# Patient Record
Sex: FEMALE | Race: WHITE | NOT HISPANIC OR LATINO | Employment: FULL TIME | URBAN - METROPOLITAN AREA
[De-identification: names, ages, dates, MRNs, and addresses within clinical notes are randomized per-mention and may not be internally consistent; named-entity substitution may affect disease eponyms.]

---

## 2018-03-20 ENCOUNTER — TRANSCRIBE ORDERS (OUTPATIENT)
Dept: LAB | Facility: HOSPITAL | Age: 33
End: 2018-03-20

## 2018-03-20 ENCOUNTER — LAB (OUTPATIENT)
Dept: LAB | Facility: HOSPITAL | Age: 33
End: 2018-03-20

## 2018-03-20 DIAGNOSIS — Z34.81 PRENATAL CARE, SUBSEQUENT PREGNANCY, FIRST TRIMESTER: ICD-10-CM

## 2018-03-20 DIAGNOSIS — Z3A.01 LESS THAN 8 WEEKS GESTATION OF PREGNANCY: Primary | ICD-10-CM

## 2018-03-20 DIAGNOSIS — Z3A.01 LESS THAN 8 WEEKS GESTATION OF PREGNANCY: ICD-10-CM

## 2018-03-20 LAB
ABO GROUP BLD: NORMAL
AMPHET+METHAMPHET UR QL: NEGATIVE
AMPHETAMINES UR QL: NEGATIVE
BARBITURATES UR QL SCN: NEGATIVE
BASOPHILS # BLD AUTO: 0 10*3/MM3 (ref 0–0.2)
BASOPHILS NFR BLD AUTO: 0 % (ref 0–1)
BENZODIAZ UR QL SCN: NEGATIVE
BILIRUB UR QL STRIP: NEGATIVE
BLD GP AB SCN SERPL QL: NEGATIVE
BUPRENORPHINE SERPL-MCNC: NEGATIVE NG/ML
CANNABINOIDS SERPL QL: NEGATIVE
CLARITY UR: CLEAR
COCAINE UR QL: NEGATIVE
COLOR UR: YELLOW
DEPRECATED RDW RBC AUTO: 41.5 FL (ref 37–54)
EOSINOPHIL # BLD AUTO: 0.01 10*3/MM3 (ref 0–0.3)
EOSINOPHIL NFR BLD AUTO: 0.2 % (ref 0–3)
ERYTHROCYTE [DISTWIDTH] IN BLOOD BY AUTOMATED COUNT: 13.6 % (ref 11.3–14.5)
GLUCOSE BLD-MCNC: 88 MG/DL (ref 70–100)
GLUCOSE UR STRIP-MCNC: NEGATIVE MG/DL
HCT VFR BLD AUTO: 38.5 % (ref 34.5–44)
HGB BLD-MCNC: 13 G/DL (ref 11.5–15.5)
HGB UR QL STRIP.AUTO: NEGATIVE
IMM GRANULOCYTES # BLD: 0 10*3/MM3 (ref 0–0.03)
IMM GRANULOCYTES NFR BLD: 0 % (ref 0–0.6)
KETONES UR QL STRIP: NEGATIVE
LEUKOCYTE ESTERASE UR QL STRIP.AUTO: NEGATIVE
LYMPHOCYTES # BLD AUTO: 0.78 10*3/MM3 (ref 0.6–4.8)
LYMPHOCYTES NFR BLD AUTO: 17.6 % (ref 24–44)
MCH RBC QN AUTO: 28.5 PG (ref 27–31)
MCHC RBC AUTO-ENTMCNC: 33.8 G/DL (ref 32–36)
MCV RBC AUTO: 84.4 FL (ref 80–99)
METHADONE UR QL SCN: NEGATIVE
MONOCYTES # BLD AUTO: 0.44 10*3/MM3 (ref 0–1)
MONOCYTES NFR BLD AUTO: 9.9 % (ref 0–12)
NEUTROPHILS # BLD AUTO: 3.2 10*3/MM3 (ref 1.5–8.3)
NEUTROPHILS NFR BLD AUTO: 72.3 % (ref 41–71)
NITRITE UR QL STRIP: NEGATIVE
OPIATES UR QL: NEGATIVE
OXYCODONE UR QL SCN: NEGATIVE
PCP UR QL SCN: NEGATIVE
PH UR STRIP.AUTO: 8.5 [PH] (ref 5–8)
PLATELET # BLD AUTO: 232 10*3/MM3 (ref 150–450)
PMV BLD AUTO: 11.7 FL (ref 6–12)
PROPOXYPH UR QL: NEGATIVE
PROT UR QL STRIP: NEGATIVE
RBC # BLD AUTO: 4.56 10*6/MM3 (ref 3.89–5.14)
RH BLD: POSITIVE
SP GR UR STRIP: 1.01 (ref 1–1.03)
TRICYCLICS UR QL SCN: NEGATIVE
UROBILINOGEN UR QL STRIP: ABNORMAL
WBC NRBC COR # BLD: 4.43 10*3/MM3 (ref 3.5–10.8)

## 2018-03-20 PROCEDURE — 86901 BLOOD TYPING SEROLOGIC RH(D): CPT

## 2018-03-20 PROCEDURE — 80306 DRUG TEST PRSMV INSTRMNT: CPT | Performed by: OBSTETRICS & GYNECOLOGY

## 2018-03-20 PROCEDURE — 82947 ASSAY GLUCOSE BLOOD QUANT: CPT | Performed by: OBSTETRICS & GYNECOLOGY

## 2018-03-20 PROCEDURE — 81003 URINALYSIS AUTO W/O SCOPE: CPT | Performed by: OBSTETRICS & GYNECOLOGY

## 2018-03-20 PROCEDURE — 80081 OBSTETRIC PANEL INC HIV TSTG: CPT

## 2018-03-20 PROCEDURE — 86900 BLOOD TYPING SEROLOGIC ABO: CPT

## 2018-03-20 PROCEDURE — 85025 COMPLETE CBC W/AUTO DIFF WBC: CPT

## 2018-03-20 PROCEDURE — 86850 RBC ANTIBODY SCREEN: CPT

## 2018-03-20 PROCEDURE — 36415 COLL VENOUS BLD VENIPUNCTURE: CPT | Performed by: OBSTETRICS & GYNECOLOGY

## 2018-03-20 PROCEDURE — 86803 HEPATITIS C AB TEST: CPT

## 2018-03-20 PROCEDURE — 86592 SYPHILIS TEST NON-TREP QUAL: CPT

## 2018-03-21 LAB — RPR SER QL: NORMAL

## 2018-03-22 LAB
HBV SURFACE AG SERPL QL IA: NORMAL
HCV AB SER DONR QL: NORMAL
HIV1+2 AB SER QL: NORMAL
RUBV IGG SER QL: NORMAL
RUBV IGG SER-ACNC: >175 IU/ML

## 2018-04-19 ENCOUNTER — LAB REQUISITION (OUTPATIENT)
Dept: LAB | Facility: HOSPITAL | Age: 33
End: 2018-04-19

## 2018-04-19 DIAGNOSIS — Z34.81 ENCOUNTER FOR SUPERVISION OF OTHER NORMAL PREGNANCY, FIRST TRIMESTER: ICD-10-CM

## 2018-04-19 DIAGNOSIS — Z00.00 ROUTINE GENERAL MEDICAL EXAMINATION AT A HEALTH CARE FACILITY: ICD-10-CM

## 2018-08-09 ENCOUNTER — TRANSCRIBE ORDERS (OUTPATIENT)
Dept: LAB | Facility: HOSPITAL | Age: 33
End: 2018-08-09

## 2018-08-09 ENCOUNTER — LAB (OUTPATIENT)
Dept: LAB | Facility: HOSPITAL | Age: 33
End: 2018-08-09

## 2018-08-09 DIAGNOSIS — Z3A.28 28 WEEKS GESTATION OF PREGNANCY: ICD-10-CM

## 2018-08-09 DIAGNOSIS — Z34.83 PRENATAL CARE, SUBSEQUENT PREGNANCY, THIRD TRIMESTER: ICD-10-CM

## 2018-08-09 DIAGNOSIS — Z34.83 PRENATAL CARE, SUBSEQUENT PREGNANCY, THIRD TRIMESTER: Primary | ICD-10-CM

## 2018-08-09 LAB
BLD GP AB SCN SERPL QL: NEGATIVE
DEPRECATED RDW RBC AUTO: 47.4 FL (ref 37–54)
ERYTHROCYTE [DISTWIDTH] IN BLOOD BY AUTOMATED COUNT: 14.4 % (ref 11.3–14.5)
GLUCOSE 1H P 100 G GLC PO SERPL-MCNC: 133 MG/DL (ref 65–140)
HCT VFR BLD AUTO: 34.7 % (ref 34.5–44)
HGB BLD-MCNC: 11.1 G/DL (ref 11.5–15.5)
MCH RBC QN AUTO: 28.8 PG (ref 27–31)
MCHC RBC AUTO-ENTMCNC: 32 G/DL (ref 32–36)
MCV RBC AUTO: 90.1 FL (ref 80–99)
PLATELET # BLD AUTO: 224 10*3/MM3 (ref 150–450)
PMV BLD AUTO: 11.1 FL (ref 6–12)
RBC # BLD AUTO: 3.85 10*6/MM3 (ref 3.89–5.14)
WBC NRBC COR # BLD: 8.47 10*3/MM3 (ref 3.5–10.8)

## 2018-08-09 PROCEDURE — 36415 COLL VENOUS BLD VENIPUNCTURE: CPT

## 2018-08-09 PROCEDURE — 82950 GLUCOSE TEST: CPT

## 2018-08-09 PROCEDURE — 86850 RBC ANTIBODY SCREEN: CPT

## 2018-08-09 PROCEDURE — 85027 COMPLETE CBC AUTOMATED: CPT

## 2018-08-09 PROCEDURE — 82962 GLUCOSE BLOOD TEST: CPT | Performed by: OBSTETRICS & GYNECOLOGY

## 2018-10-04 LAB — EXTERNAL GROUP B STREP ANTIGEN: NEGATIVE

## 2018-10-28 ENCOUNTER — ANESTHESIA EVENT (OUTPATIENT)
Dept: LABOR AND DELIVERY | Facility: HOSPITAL | Age: 33
End: 2018-10-28

## 2018-10-28 ENCOUNTER — HOSPITAL ENCOUNTER (INPATIENT)
Facility: HOSPITAL | Age: 33
LOS: 2 days | Discharge: HOME | End: 2018-10-30
Attending: OBSTETRICS & GYNECOLOGY | Admitting: NURSE PRACTITIONER

## 2018-10-28 ENCOUNTER — ANESTHESIA (OUTPATIENT)
Dept: LABOR AND DELIVERY | Facility: HOSPITAL | Age: 33
End: 2018-10-28

## 2018-10-28 PROBLEM — Z34.90 PATIENT CURRENTLY PREGNANT: Status: ACTIVE | Noted: 2018-10-28

## 2018-10-28 LAB
ABO GROUP BLD: NORMAL
BLD GP AB SCN SERPL QL: NEGATIVE
DEPRECATED RDW RBC AUTO: 46.5 FL (ref 37–54)
ERYTHROCYTE [DISTWIDTH] IN BLOOD BY AUTOMATED COUNT: 14.9 % (ref 11.3–14.5)
HCT VFR BLD AUTO: 33.5 % (ref 34.5–44)
HGB BLD-MCNC: 10.8 G/DL (ref 11.5–15.5)
MCH RBC QN AUTO: 27.4 PG (ref 27–31)
MCHC RBC AUTO-ENTMCNC: 32.2 G/DL (ref 32–36)
MCV RBC AUTO: 85 FL (ref 80–99)
PLATELET # BLD AUTO: 216 10*3/MM3 (ref 150–450)
PMV BLD AUTO: 11 FL (ref 6–12)
RBC # BLD AUTO: 3.94 10*6/MM3 (ref 3.89–5.14)
RH BLD: POSITIVE
T&S EXPIRATION DATE: NORMAL
WBC NRBC COR # BLD: 8.78 10*3/MM3 (ref 3.5–10.8)

## 2018-10-28 PROCEDURE — 25010000002 ONDANSETRON PER 1 MG: Performed by: NURSE PRACTITIONER

## 2018-10-28 PROCEDURE — 86901 BLOOD TYPING SEROLOGIC RH(D): CPT | Performed by: NURSE PRACTITIONER

## 2018-10-28 PROCEDURE — C1755 CATHETER, INTRASPINAL: HCPCS

## 2018-10-28 PROCEDURE — 86900 BLOOD TYPING SEROLOGIC ABO: CPT | Performed by: NURSE PRACTITIONER

## 2018-10-28 PROCEDURE — C1755 CATHETER, INTRASPINAL: HCPCS | Performed by: ANESTHESIOLOGY

## 2018-10-28 PROCEDURE — 51703 INSERT BLADDER CATH COMPLEX: CPT

## 2018-10-28 PROCEDURE — 85027 COMPLETE CBC AUTOMATED: CPT | Performed by: NURSE PRACTITIONER

## 2018-10-28 PROCEDURE — 86850 RBC ANTIBODY SCREEN: CPT | Performed by: NURSE PRACTITIONER

## 2018-10-28 PROCEDURE — 25010000002 FENTANYL CITRATE (PF) 100 MCG/2ML SOLUTION: Performed by: ANESTHESIOLOGY

## 2018-10-28 PROCEDURE — 59025 FETAL NON-STRESS TEST: CPT

## 2018-10-28 PROCEDURE — 25010000002 ROPIVACAINE PER 1 MG: Performed by: ANESTHESIOLOGY

## 2018-10-28 RX ORDER — MAGNESIUM CARB/ALUMINUM HYDROX 105-160MG
30 TABLET,CHEWABLE ORAL ONCE
Status: DISCONTINUED | OUTPATIENT
Start: 2018-10-28 | End: 2018-10-29 | Stop reason: HOSPADM

## 2018-10-28 RX ORDER — METOCLOPRAMIDE HYDROCHLORIDE 5 MG/ML
10 INJECTION INTRAMUSCULAR; INTRAVENOUS ONCE AS NEEDED
Status: DISCONTINUED | OUTPATIENT
Start: 2018-10-28 | End: 2018-10-29 | Stop reason: HOSPADM

## 2018-10-28 RX ORDER — BUTORPHANOL TARTRATE 1 MG/ML
1 INJECTION, SOLUTION INTRAMUSCULAR; INTRAVENOUS
Status: DISCONTINUED | OUTPATIENT
Start: 2018-10-28 | End: 2018-10-29 | Stop reason: HOSPADM

## 2018-10-28 RX ORDER — ONDANSETRON 2 MG/ML
4 INJECTION INTRAMUSCULAR; INTRAVENOUS EVERY 6 HOURS PRN
Status: DISCONTINUED | OUTPATIENT
Start: 2018-10-28 | End: 2018-10-29 | Stop reason: HOSPADM

## 2018-10-28 RX ORDER — LIDOCAINE HYDROCHLORIDE 10 MG/ML
5 INJECTION, SOLUTION EPIDURAL; INFILTRATION; INTRACAUDAL; PERINEURAL AS NEEDED
Status: DISCONTINUED | OUTPATIENT
Start: 2018-10-28 | End: 2018-10-29 | Stop reason: HOSPADM

## 2018-10-28 RX ORDER — EPHEDRINE SULFATE/0.9% NACL/PF 25 MG/5 ML
10 SYRINGE (ML) INTRAVENOUS
Status: DISCONTINUED | OUTPATIENT
Start: 2018-10-28 | End: 2018-10-29 | Stop reason: HOSPADM

## 2018-10-28 RX ORDER — PRENATAL WITH FERROUS FUM AND FOLIC ACID 3080; 920; 120; 400; 22; 1.84; 3; 20; 10; 1; 12; 200; 27; 25; 2 [IU]/1; [IU]/1; MG/1; [IU]/1; MG/1; MG/1; MG/1; MG/1; MG/1; MG/1; UG/1; MG/1; MG/1; MG/1; MG/1
1 TABLET ORAL DAILY
COMMUNITY

## 2018-10-28 RX ORDER — SODIUM CHLORIDE 0.9 % (FLUSH) 0.9 %
3 SYRINGE (ML) INJECTION EVERY 12 HOURS SCHEDULED
Status: DISCONTINUED | OUTPATIENT
Start: 2018-10-28 | End: 2018-10-29 | Stop reason: HOSPADM

## 2018-10-28 RX ORDER — SODIUM CHLORIDE 0.9 % (FLUSH) 0.9 %
3-10 SYRINGE (ML) INJECTION AS NEEDED
Status: DISCONTINUED | OUTPATIENT
Start: 2018-10-28 | End: 2018-10-29 | Stop reason: HOSPADM

## 2018-10-28 RX ORDER — PROMETHAZINE HYDROCHLORIDE 25 MG/ML
12.5 INJECTION, SOLUTION INTRAMUSCULAR; INTRAVENOUS EVERY 6 HOURS PRN
Status: DISCONTINUED | OUTPATIENT
Start: 2018-10-28 | End: 2018-10-29 | Stop reason: HOSPADM

## 2018-10-28 RX ORDER — DIPHENHYDRAMINE HCL 25 MG
25 CAPSULE ORAL NIGHTLY PRN
Status: DISCONTINUED | OUTPATIENT
Start: 2018-10-28 | End: 2018-10-29 | Stop reason: HOSPADM

## 2018-10-28 RX ORDER — FENTANYL CITRATE 50 UG/ML
INJECTION, SOLUTION INTRAMUSCULAR; INTRAVENOUS AS NEEDED
Status: DISCONTINUED | OUTPATIENT
Start: 2018-10-28 | End: 2018-10-29 | Stop reason: SURG

## 2018-10-28 RX ORDER — SODIUM CHLORIDE, SODIUM LACTATE, POTASSIUM CHLORIDE, CALCIUM CHLORIDE 600; 310; 30; 20 MG/100ML; MG/100ML; MG/100ML; MG/100ML
125 INJECTION, SOLUTION INTRAVENOUS CONTINUOUS
Status: DISCONTINUED | OUTPATIENT
Start: 2018-10-28 | End: 2018-10-29

## 2018-10-28 RX ORDER — PROMETHAZINE HYDROCHLORIDE 12.5 MG/1
12.5 TABLET ORAL EVERY 6 HOURS PRN
Status: DISCONTINUED | OUTPATIENT
Start: 2018-10-28 | End: 2018-10-29 | Stop reason: HOSPADM

## 2018-10-28 RX ORDER — ACETAMINOPHEN 325 MG/1
650 TABLET ORAL EVERY 4 HOURS PRN
Status: DISCONTINUED | OUTPATIENT
Start: 2018-10-28 | End: 2018-10-29 | Stop reason: HOSPADM

## 2018-10-28 RX ORDER — LIDOCAINE HYDROCHLORIDE AND EPINEPHRINE 15; 5 MG/ML; UG/ML
INJECTION, SOLUTION EPIDURAL AS NEEDED
Status: DISCONTINUED | OUTPATIENT
Start: 2018-10-28 | End: 2018-10-29 | Stop reason: SURG

## 2018-10-28 RX ORDER — ONDANSETRON 2 MG/ML
4 INJECTION INTRAMUSCULAR; INTRAVENOUS ONCE AS NEEDED
Status: DISCONTINUED | OUTPATIENT
Start: 2018-10-28 | End: 2018-10-29 | Stop reason: HOSPADM

## 2018-10-28 RX ORDER — DIPHENHYDRAMINE HCL 25 MG
25 CAPSULE ORAL EVERY 6 HOURS PRN
Status: DISCONTINUED | OUTPATIENT
Start: 2018-10-28 | End: 2018-10-29 | Stop reason: HOSPADM

## 2018-10-28 RX ORDER — ROPIVACAINE HYDROCHLORIDE 2 MG/ML
16 INJECTION, SOLUTION EPIDURAL; INFILTRATION; PERINEURAL CONTINUOUS
Status: DISCONTINUED | OUTPATIENT
Start: 2018-10-28 | End: 2018-10-29

## 2018-10-28 RX ORDER — DIPHENHYDRAMINE HYDROCHLORIDE 50 MG/ML
25 INJECTION INTRAMUSCULAR; INTRAVENOUS NIGHTLY PRN
Status: DISCONTINUED | OUTPATIENT
Start: 2018-10-28 | End: 2018-10-29 | Stop reason: RX

## 2018-10-28 RX ORDER — TRISODIUM CITRATE DIHYDRATE AND CITRIC ACID MONOHYDRATE 500; 334 MG/5ML; MG/5ML
30 SOLUTION ORAL ONCE
Status: DISCONTINUED | OUTPATIENT
Start: 2018-10-28 | End: 2018-10-29 | Stop reason: HOSPADM

## 2018-10-28 RX ORDER — BUTORPHANOL TARTRATE 1 MG/ML
2 INJECTION, SOLUTION INTRAMUSCULAR; INTRAVENOUS
Status: DISCONTINUED | OUTPATIENT
Start: 2018-10-28 | End: 2018-10-29 | Stop reason: HOSPADM

## 2018-10-28 RX ORDER — ONDANSETRON 4 MG/1
4 TABLET, FILM COATED ORAL EVERY 6 HOURS PRN
Status: DISCONTINUED | OUTPATIENT
Start: 2018-10-28 | End: 2018-10-29 | Stop reason: HOSPADM

## 2018-10-28 RX ORDER — TERBUTALINE SULFATE 1 MG/ML
0.25 INJECTION, SOLUTION SUBCUTANEOUS AS NEEDED
Status: DISCONTINUED | OUTPATIENT
Start: 2018-10-28 | End: 2018-10-29 | Stop reason: HOSPADM

## 2018-10-28 RX ORDER — OXYTOCIN-SODIUM CHLORIDE 0.9% IV SOLN 30 UNIT/500ML 30-0.9/5 UT/ML-%
2-30 SOLUTION INTRAVENOUS
Status: DISCONTINUED | OUTPATIENT
Start: 2018-10-28 | End: 2018-10-29

## 2018-10-28 RX ORDER — PROMETHAZINE HYDROCHLORIDE 12.5 MG/1
12.5 SUPPOSITORY RECTAL EVERY 6 HOURS PRN
Status: DISCONTINUED | OUTPATIENT
Start: 2018-10-28 | End: 2018-10-29 | Stop reason: HOSPADM

## 2018-10-28 RX ADMIN — SODIUM CHLORIDE, POTASSIUM CHLORIDE, SODIUM LACTATE AND CALCIUM CHLORIDE 125 ML/HR: 600; 310; 30; 20 INJECTION, SOLUTION INTRAVENOUS at 18:23

## 2018-10-28 RX ADMIN — SODIUM CHLORIDE, POTASSIUM CHLORIDE, SODIUM LACTATE AND CALCIUM CHLORIDE 1000 ML: 600; 310; 30; 20 INJECTION, SOLUTION INTRAVENOUS at 17:19

## 2018-10-28 RX ADMIN — ROPIVACAINE HYDROCHLORIDE 10 ML: 5 INJECTION, SOLUTION EPIDURAL; INFILTRATION; PERINEURAL at 17:31

## 2018-10-28 RX ADMIN — ONDANSETRON 4 MG: 2 INJECTION INTRAMUSCULAR; INTRAVENOUS at 19:10

## 2018-10-28 RX ADMIN — LIDOCAINE HYDROCHLORIDE AND EPINEPHRINE 3 ML: 15; 5 INJECTION, SOLUTION EPIDURAL at 17:28

## 2018-10-28 RX ADMIN — ROPIVACAINE HYDROCHLORIDE 16 ML/HR: 2 INJECTION, SOLUTION EPIDURAL; INFILTRATION at 17:36

## 2018-10-28 RX ADMIN — OXYTOCIN 1 MILLI-UNITS/MIN: 10 INJECTION INTRAVENOUS at 18:55

## 2018-10-28 RX ADMIN — SODIUM CHLORIDE, POTASSIUM CHLORIDE, SODIUM LACTATE AND CALCIUM CHLORIDE 125 ML/HR: 600; 310; 30; 20 INJECTION, SOLUTION INTRAVENOUS at 16:45

## 2018-10-28 RX ADMIN — FENTANYL CITRATE 100 MCG: 50 INJECTION, SOLUTION INTRAMUSCULAR; INTRAVENOUS at 17:31

## 2018-10-28 NOTE — ANESTHESIA PREPROCEDURE EVALUATION
Anesthesia Evaluation     Patient summary reviewed and Nursing notes reviewed   NPO Solid Status: > 6 hours  NPO Liquid Status: > 2 hours           Airway   Mallampati: I  TM distance: >3 FB  Neck ROM: full  No difficulty expected  Dental      Pulmonary - negative pulmonary ROS   Cardiovascular - negative cardio ROS        Neuro/Psych- negative ROS  GI/Hepatic/Renal/Endo - negative ROS     Musculoskeletal (-) negative ROS    Abdominal    Substance History - negative use     OB/GYN    (+) Pregnant,         Other                        Anesthesia Plan    ASA 2     epidural     Anesthetic plan, all risks, benefits, and alternatives have been provided, discussed and informed consent has been obtained with: patient.

## 2018-10-28 NOTE — ANESTHESIA PROCEDURE NOTES
Labor Epidural      Patient reassessed immediately prior to procedure    Patient location during procedure: OB  Performed By  Anesthesiologist: CRAIG BALDWIN  Preanesthetic Checklist  Completed: patient identified, site marked, surgical consent, pre-op evaluation, timeout performed, IV checked, risks and benefits discussed and monitors and equipment checked  Prep:  Pt Position:sitting  Sterile Tech:gloves, mask, sterile barrier and cap  Prep:chlorhexidine gluconate and isopropyl alcohol  Monitoring:blood pressure monitoring and continuous pulse oximetry  Epidural Block Procedure:  Approach:midline  Guidance:landmark technique and palpation technique  Location:L2-L3  Needle Type:Tuohy  Needle Gauge:17 G  Loss of Resistance Medium: air  Loss of Resistance: 4cm  Cath Depth at skin:9 cm  Paresthesia: none  Aspiration:negative  Test Dose:negative  Number of Attempts: 1  Post Assessment:  Dressing:secured with tape and occlusive dressing applied (Tegaderm Placed)  Pt Tolerance:patient tolerated the procedure well with no apparent complications  Complications:no

## 2018-10-29 PROBLEM — Z34.90 PATIENT CURRENTLY PREGNANT: Status: RESOLVED | Noted: 2018-10-28 | Resolved: 2018-10-29

## 2018-10-29 PROCEDURE — 0KQM0ZZ REPAIR PERINEUM MUSCLE, OPEN APPROACH: ICD-10-PCS | Performed by: NURSE PRACTITIONER

## 2018-10-29 PROCEDURE — 0UQGXZZ REPAIR VAGINA, EXTERNAL APPROACH: ICD-10-PCS | Performed by: NURSE PRACTITIONER

## 2018-10-29 RX ORDER — OXYCODONE HYDROCHLORIDE AND ACETAMINOPHEN 5; 325 MG/1; MG/1
1 TABLET ORAL EVERY 4 HOURS PRN
Status: DISCONTINUED | OUTPATIENT
Start: 2018-10-29 | End: 2018-10-30 | Stop reason: HOSPADM

## 2018-10-29 RX ORDER — CARBOPROST TROMETHAMINE 250 UG/ML
250 INJECTION, SOLUTION INTRAMUSCULAR AS NEEDED
Status: DISCONTINUED | OUTPATIENT
Start: 2018-10-29 | End: 2018-10-29 | Stop reason: HOSPADM

## 2018-10-29 RX ORDER — IBUPROFEN 600 MG/1
600 TABLET ORAL EVERY 6 HOURS PRN
Status: DISCONTINUED | OUTPATIENT
Start: 2018-10-29 | End: 2018-10-29

## 2018-10-29 RX ORDER — LANOLIN 100 %
OINTMENT (GRAM) TOPICAL
Status: DISCONTINUED | OUTPATIENT
Start: 2018-10-29 | End: 2018-10-30 | Stop reason: HOSPADM

## 2018-10-29 RX ORDER — METHYLERGONOVINE MALEATE 0.2 MG/ML
200 INJECTION INTRAVENOUS ONCE AS NEEDED
Status: DISCONTINUED | OUTPATIENT
Start: 2018-10-29 | End: 2018-10-29 | Stop reason: HOSPADM

## 2018-10-29 RX ORDER — MISOPROSTOL 200 UG/1
800 TABLET ORAL AS NEEDED
Status: DISCONTINUED | OUTPATIENT
Start: 2018-10-29 | End: 2018-10-29 | Stop reason: HOSPADM

## 2018-10-29 RX ORDER — DOCUSATE SODIUM 100 MG/1
100 CAPSULE, LIQUID FILLED ORAL 2 TIMES DAILY
Status: DISCONTINUED | OUTPATIENT
Start: 2018-10-29 | End: 2018-10-30 | Stop reason: HOSPADM

## 2018-10-29 RX ORDER — HYDROCODONE BITARTRATE AND ACETAMINOPHEN 5; 325 MG/1; MG/1
1 TABLET ORAL EVERY 4 HOURS PRN
Status: DISCONTINUED | OUTPATIENT
Start: 2018-10-29 | End: 2018-10-29

## 2018-10-29 RX ORDER — OXYTOCIN-SODIUM CHLORIDE 0.9% IV SOLN 30 UNIT/500ML 30-0.9/5 UT/ML-%
650 SOLUTION INTRAVENOUS ONCE
Status: COMPLETED | OUTPATIENT
Start: 2018-10-29 | End: 2018-10-29

## 2018-10-29 RX ORDER — IBUPROFEN 600 MG/1
600 TABLET ORAL EVERY 6 HOURS PRN
Status: DISCONTINUED | OUTPATIENT
Start: 2018-10-29 | End: 2018-10-29 | Stop reason: HOSPADM

## 2018-10-29 RX ORDER — SODIUM CHLORIDE 0.9 % (FLUSH) 0.9 %
1-10 SYRINGE (ML) INJECTION AS NEEDED
Status: DISCONTINUED | OUTPATIENT
Start: 2018-10-29 | End: 2018-10-30 | Stop reason: HOSPADM

## 2018-10-29 RX ORDER — OXYTOCIN-SODIUM CHLORIDE 0.9% IV SOLN 30 UNIT/500ML 30-0.9/5 UT/ML-%
85 SOLUTION INTRAVENOUS ONCE
Status: COMPLETED | OUTPATIENT
Start: 2018-10-29 | End: 2018-10-29

## 2018-10-29 RX ORDER — LIDOCAINE HYDROCHLORIDE 20 MG/ML
10 INJECTION, SOLUTION INFILTRATION; PERINEURAL ONCE
Status: DISCONTINUED | OUTPATIENT
Start: 2018-10-29 | End: 2018-10-29

## 2018-10-29 RX ORDER — ONDANSETRON 2 MG/ML
4 INJECTION INTRAMUSCULAR; INTRAVENOUS EVERY 6 HOURS PRN
Status: DISCONTINUED | OUTPATIENT
Start: 2018-10-29 | End: 2018-10-30 | Stop reason: HOSPADM

## 2018-10-29 RX ADMIN — LIDOCAINE HYDROCHLORIDE 5 ML: 10 INJECTION, SOLUTION EPIDURAL; INFILTRATION; INTRACAUDAL; PERINEURAL at 00:00

## 2018-10-29 RX ADMIN — OXYTOCIN 650 ML/HR: 10 INJECTION INTRAVENOUS at 00:03

## 2018-10-29 RX ADMIN — IBUPROFEN 600 MG: 600 TABLET ORAL at 06:52

## 2018-10-29 RX ADMIN — IBUPROFEN 600 MG: 100 SUSPENSION ORAL at 17:40

## 2018-10-29 RX ADMIN — IBUPROFEN 600 MG: 100 SUSPENSION ORAL at 23:43

## 2018-10-29 RX ADMIN — Medication: at 06:52

## 2018-10-29 RX ADMIN — Medication 5 APPLICATION: at 06:53

## 2018-10-29 RX ADMIN — WITCH HAZEL 1 PAD: 500 SOLUTION RECTAL; TOPICAL at 06:52

## 2018-10-29 RX ADMIN — OXYTOCIN 85 ML/HR: 10 INJECTION INTRAVENOUS at 00:35

## 2018-10-29 RX ADMIN — IBUPROFEN 600 MG: 100 SUSPENSION ORAL at 12:23

## 2018-10-29 RX ADMIN — DOCUSATE SODIUM 100 MG: 100 CAPSULE, LIQUID FILLED ORAL at 21:02

## 2018-10-29 NOTE — ANESTHESIA POSTPROCEDURE EVALUATION
Patient: Annette Garsia    Procedure Summary     Date:  10/28/18 Room / Location:      Anesthesia Start:  1718 Anesthesia Stop:  10/29/18 0003    Procedure:  LABOR ANALGESIA Diagnosis:      Scheduled Providers:   Provider:  Sung Appiah DO    Anesthesia Type:  epidural ASA Status:  2          Anesthesia Type: epidural  Last vitals  BP   106/66 (10/29/18 0745)   Temp   99.2 °F (37.3 °C) (10/29/18 0745)   Pulse   72 (10/29/18 0745)   Resp   18 (10/29/18 0745)     SpO2   100 % (10/28/18 1729)     Post Anesthesia Care and Evaluation    Patient location during evaluation: bedside  Patient participation: complete - patient participated  Level of consciousness: awake and alert  Pain management: adequate  Airway patency: patent  Anesthetic complications: No anesthetic complications    Cardiovascular status: acceptable  Respiratory status: acceptable  Hydration status: acceptable  Post Neuraxial Block status: Motor and sensory function returned to baseline and No signs or symptoms of PDPH

## 2018-10-30 VITALS
OXYGEN SATURATION: 100 % | BODY MASS INDEX: 25.76 KG/M2 | TEMPERATURE: 97.9 F | HEIGHT: 68 IN | DIASTOLIC BLOOD PRESSURE: 58 MMHG | SYSTOLIC BLOOD PRESSURE: 99 MMHG | WEIGHT: 170 LBS | RESPIRATION RATE: 16 BRPM | HEART RATE: 64 BPM

## 2018-10-30 LAB
BASOPHILS # BLD AUTO: 0.01 10*3/MM3 (ref 0–0.2)
BASOPHILS NFR BLD AUTO: 0.1 % (ref 0–1)
DEPRECATED RDW RBC AUTO: 47.3 FL (ref 37–54)
EOSINOPHIL # BLD AUTO: 0.03 10*3/MM3 (ref 0–0.3)
EOSINOPHIL NFR BLD AUTO: 0.3 % (ref 0–3)
ERYTHROCYTE [DISTWIDTH] IN BLOOD BY AUTOMATED COUNT: 15.5 % (ref 11.3–14.5)
HCT VFR BLD AUTO: 32.3 % (ref 34.5–44)
HGB BLD-MCNC: 10.2 G/DL (ref 11.5–15.5)
IMM GRANULOCYTES # BLD: 0.05 10*3/MM3 (ref 0–0.03)
IMM GRANULOCYTES NFR BLD: 0.4 % (ref 0–0.6)
LYMPHOCYTES # BLD AUTO: 1.34 10*3/MM3 (ref 0.6–4.8)
LYMPHOCYTES NFR BLD AUTO: 11.3 % (ref 24–44)
MCH RBC QN AUTO: 26.8 PG (ref 27–31)
MCHC RBC AUTO-ENTMCNC: 31.6 G/DL (ref 32–36)
MCV RBC AUTO: 84.8 FL (ref 80–99)
MONOCYTES # BLD AUTO: 0.57 10*3/MM3 (ref 0–1)
MONOCYTES NFR BLD AUTO: 4.8 % (ref 0–12)
NEUTROPHILS # BLD AUTO: 9.9 10*3/MM3 (ref 1.5–8.3)
NEUTROPHILS NFR BLD AUTO: 83.5 % (ref 41–71)
PLATELET # BLD AUTO: 209 10*3/MM3 (ref 150–450)
PMV BLD AUTO: 10.8 FL (ref 6–12)
RBC # BLD AUTO: 3.81 10*6/MM3 (ref 3.89–5.14)
WBC NRBC COR # BLD: 11.85 10*3/MM3 (ref 3.5–10.8)

## 2018-10-30 PROCEDURE — 85025 COMPLETE CBC W/AUTO DIFF WBC: CPT | Performed by: NURSE PRACTITIONER

## 2018-10-30 RX ORDER — IBUPROFEN 800 MG/1
800 TABLET ORAL EVERY 6 HOURS PRN
Qty: 30 TABLET | Refills: 1 | Status: SHIPPED | OUTPATIENT
Start: 2018-10-30 | End: 2018-10-31

## 2018-10-30 RX ADMIN — IBUPROFEN 600 MG: 100 SUSPENSION ORAL at 13:03

## 2018-10-30 RX ADMIN — IBUPROFEN 600 MG: 100 SUSPENSION ORAL at 06:22

## 2018-10-31 RX ORDER — IBUPROFEN 800 MG/1
800 TABLET ORAL EVERY 6 HOURS PRN
Qty: 30 TABLET | Refills: 1 | Status: SHIPPED | OUTPATIENT
Start: 2018-10-31 | End: 2018-11-30

## 2018-11-04 ENCOUNTER — APPOINTMENT (OUTPATIENT)
Dept: LABOR AND DELIVERY | Facility: HOSPITAL | Age: 33
End: 2018-11-04

## 2020-07-27 ENCOUNTER — TRANSCRIBE ORDERS (OUTPATIENT)
Dept: LAB | Facility: HOSPITAL | Age: 35
End: 2020-07-27

## 2020-07-27 ENCOUNTER — LAB (OUTPATIENT)
Dept: LAB | Facility: HOSPITAL | Age: 35
End: 2020-07-27

## 2020-07-27 DIAGNOSIS — O02.1 MISSED ABORTION: Primary | ICD-10-CM

## 2020-07-27 DIAGNOSIS — O02.1 MISSED ABORTION: ICD-10-CM

## 2020-07-27 LAB — HCG INTACT+B SERPL-ACNC: 6.04 MIU/ML

## 2020-07-27 PROCEDURE — 84144 ASSAY OF PROGESTERONE: CPT

## 2020-07-27 PROCEDURE — 84702 CHORIONIC GONADOTROPIN TEST: CPT | Performed by: NURSE PRACTITIONER

## 2020-07-27 PROCEDURE — 36415 COLL VENOUS BLD VENIPUNCTURE: CPT | Performed by: NURSE PRACTITIONER

## 2020-07-28 LAB — PROGEST SERPL-MCNC: 0.13 NG/ML

## 2021-02-01 ENCOUNTER — TRANSCRIBE ORDERS (OUTPATIENT)
Dept: LAB | Facility: HOSPITAL | Age: 36
End: 2021-02-01

## 2021-02-01 ENCOUNTER — LAB (OUTPATIENT)
Dept: LAB | Facility: HOSPITAL | Age: 36
End: 2021-02-01

## 2021-02-01 DIAGNOSIS — N97.9 PRIMARY FEMALE INFERTILITY: Primary | ICD-10-CM

## 2021-02-01 LAB
ESTRADIOL SERPL HS-MCNC: 109 PG/ML
FSH SERPL-ACNC: 1.78 MIU/ML
HCG INTACT+B SERPL-ACNC: 288.1 MIU/ML
LH SERPL-ACNC: 1.58 MIU/ML
PROGEST SERPL-MCNC: 5.18 NG/ML
PROLACTIN SERPL-MCNC: 7.28 NG/ML (ref 4.79–23.3)
TSH SERPL DL<=0.05 MIU/L-ACNC: 1.2 UIU/ML (ref 0.27–4.2)

## 2021-02-01 PROCEDURE — 84144 ASSAY OF PROGESTERONE: CPT

## 2021-02-01 PROCEDURE — 36415 COLL VENOUS BLD VENIPUNCTURE: CPT

## 2021-02-01 PROCEDURE — 83002 ASSAY OF GONADOTROPIN (LH): CPT

## 2021-02-01 PROCEDURE — 84146 ASSAY OF PROLACTIN: CPT

## 2021-02-01 PROCEDURE — 83001 ASSAY OF GONADOTROPIN (FSH): CPT

## 2021-02-01 PROCEDURE — 84443 ASSAY THYROID STIM HORMONE: CPT

## 2021-02-01 PROCEDURE — 84702 CHORIONIC GONADOTROPIN TEST: CPT

## 2021-02-01 PROCEDURE — 82670 ASSAY OF TOTAL ESTRADIOL: CPT

## 2021-02-03 ENCOUNTER — LAB (OUTPATIENT)
Dept: LAB | Facility: HOSPITAL | Age: 36
End: 2021-02-03

## 2021-02-03 ENCOUNTER — TRANSCRIBE ORDERS (OUTPATIENT)
Dept: LAB | Facility: HOSPITAL | Age: 36
End: 2021-02-03

## 2021-02-03 DIAGNOSIS — N97.9 PRIMARY FEMALE INFERTILITY: Primary | ICD-10-CM

## 2021-02-03 DIAGNOSIS — N97.9 PRIMARY FEMALE INFERTILITY: ICD-10-CM

## 2021-02-03 LAB
ESTRADIOL SERPL HS-MCNC: 111 PG/ML
FSH SERPL-ACNC: 1.46 MIU/ML
HCG INTACT+B SERPL-ACNC: 313.7 MIU/ML
LH SERPL-ACNC: 1.06 MIU/ML
PROGEST SERPL-MCNC: 9.9 NG/ML
PROLACTIN SERPL-MCNC: 10.8 NG/ML (ref 4.79–23.3)
TSH SERPL DL<=0.05 MIU/L-ACNC: 1.63 UIU/ML (ref 0.27–4.2)

## 2021-02-03 PROCEDURE — 84702 CHORIONIC GONADOTROPIN TEST: CPT

## 2021-02-03 PROCEDURE — 36415 COLL VENOUS BLD VENIPUNCTURE: CPT | Performed by: OBSTETRICS & GYNECOLOGY

## 2021-02-03 PROCEDURE — 83002 ASSAY OF GONADOTROPIN (LH): CPT

## 2021-02-03 PROCEDURE — 84146 ASSAY OF PROLACTIN: CPT

## 2021-02-03 PROCEDURE — 84443 ASSAY THYROID STIM HORMONE: CPT

## 2021-02-03 PROCEDURE — 82670 ASSAY OF TOTAL ESTRADIOL: CPT | Performed by: OBSTETRICS & GYNECOLOGY

## 2021-02-03 PROCEDURE — 84144 ASSAY OF PROGESTERONE: CPT | Performed by: OBSTETRICS & GYNECOLOGY

## 2021-02-03 PROCEDURE — 83001 ASSAY OF GONADOTROPIN (FSH): CPT

## 2021-02-08 ENCOUNTER — LAB (OUTPATIENT)
Dept: LAB | Facility: HOSPITAL | Age: 36
End: 2021-02-08

## 2021-02-08 ENCOUNTER — TRANSCRIBE ORDERS (OUTPATIENT)
Dept: LAB | Facility: HOSPITAL | Age: 36
End: 2021-02-08

## 2021-02-08 DIAGNOSIS — N91.2 ABSENCE OF MENSTRUATION: Primary | ICD-10-CM

## 2021-02-08 DIAGNOSIS — N91.2 ABSENCE OF MENSTRUATION: ICD-10-CM

## 2021-02-08 LAB — HCG INTACT+B SERPL-ACNC: 671.7 MIU/ML

## 2021-02-08 PROCEDURE — 36415 COLL VENOUS BLD VENIPUNCTURE: CPT

## 2021-02-08 PROCEDURE — 84702 CHORIONIC GONADOTROPIN TEST: CPT

## 2021-02-12 ENCOUNTER — TRANSCRIBE ORDERS (OUTPATIENT)
Dept: LAB | Facility: HOSPITAL | Age: 36
End: 2021-02-12

## 2021-02-12 ENCOUNTER — LAB (OUTPATIENT)
Dept: LAB | Facility: HOSPITAL | Age: 36
End: 2021-02-12

## 2021-02-12 DIAGNOSIS — O20.0 THREATENED ABORTION, ANTEPARTUM: ICD-10-CM

## 2021-02-12 DIAGNOSIS — O20.0 THREATENED ABORTION, ANTEPARTUM: Primary | ICD-10-CM

## 2021-02-12 LAB — HCG INTACT+B SERPL-ACNC: 543.4 MIU/ML

## 2021-02-12 PROCEDURE — 84702 CHORIONIC GONADOTROPIN TEST: CPT

## 2021-02-12 PROCEDURE — 36415 COLL VENOUS BLD VENIPUNCTURE: CPT

## 2021-02-19 ENCOUNTER — TRANSCRIBE ORDERS (OUTPATIENT)
Dept: LAB | Facility: HOSPITAL | Age: 36
End: 2021-02-19

## 2021-02-19 ENCOUNTER — LAB (OUTPATIENT)
Dept: LAB | Facility: HOSPITAL | Age: 36
End: 2021-02-19

## 2021-02-19 DIAGNOSIS — N91.2 ABSENCE OF MENSTRUATION: Primary | ICD-10-CM

## 2021-02-19 LAB — HCG INTACT+B SERPL-ACNC: 70.19 MIU/ML

## 2021-02-19 PROCEDURE — 84702 CHORIONIC GONADOTROPIN TEST: CPT | Performed by: OBSTETRICS & GYNECOLOGY

## 2021-02-19 PROCEDURE — 36415 COLL VENOUS BLD VENIPUNCTURE: CPT | Performed by: OBSTETRICS & GYNECOLOGY

## 2021-03-01 ENCOUNTER — LAB (OUTPATIENT)
Dept: LAB | Facility: HOSPITAL | Age: 36
End: 2021-03-01

## 2021-03-01 ENCOUNTER — TRANSCRIBE ORDERS (OUTPATIENT)
Dept: LAB | Facility: HOSPITAL | Age: 36
End: 2021-03-01

## 2021-03-01 DIAGNOSIS — O42.10: ICD-10-CM

## 2021-03-01 DIAGNOSIS — O42.10: Primary | ICD-10-CM

## 2021-03-01 LAB — HCG INTACT+B SERPL-ACNC: 1.35 MIU/ML

## 2021-03-01 PROCEDURE — 36415 COLL VENOUS BLD VENIPUNCTURE: CPT

## 2021-03-01 PROCEDURE — 84702 CHORIONIC GONADOTROPIN TEST: CPT

## 2021-10-28 ENCOUNTER — LAB (OUTPATIENT)
Dept: LAB | Facility: HOSPITAL | Age: 36
End: 2021-10-28

## 2021-10-28 ENCOUNTER — TRANSCRIBE ORDERS (OUTPATIENT)
Dept: LAB | Facility: HOSPITAL | Age: 36
End: 2021-10-28

## 2021-10-28 DIAGNOSIS — N91.2 ABSENCE OF MENSTRUATION: ICD-10-CM

## 2021-10-28 DIAGNOSIS — N91.2 ABSENCE OF MENSTRUATION: Primary | ICD-10-CM

## 2021-10-28 PROCEDURE — 36415 COLL VENOUS BLD VENIPUNCTURE: CPT

## 2021-10-28 PROCEDURE — 84702 CHORIONIC GONADOTROPIN TEST: CPT

## 2021-10-29 LAB — HCG INTACT+B SERPL-ACNC: 1058 MIU/ML

## 2021-12-27 ENCOUNTER — TRANSCRIBE ORDERS (OUTPATIENT)
Dept: LAB | Facility: HOSPITAL | Age: 36
End: 2021-12-27

## 2021-12-27 ENCOUNTER — LAB (OUTPATIENT)
Dept: LAB | Facility: HOSPITAL | Age: 36
End: 2021-12-27

## 2021-12-27 DIAGNOSIS — Z3A.08 8 WEEKS GESTATION OF PREGNANCY: Primary | ICD-10-CM

## 2021-12-27 DIAGNOSIS — Z34.81 PRENATAL CARE, SUBSEQUENT PREGNANCY, FIRST TRIMESTER: ICD-10-CM

## 2021-12-27 DIAGNOSIS — Z3A.08 8 WEEKS GESTATION OF PREGNANCY: ICD-10-CM

## 2021-12-27 LAB
ABO GROUP BLD: NORMAL
AMPHET+METHAMPHET UR QL: NEGATIVE
AMPHETAMINES UR QL: NEGATIVE
BARBITURATES UR QL SCN: NEGATIVE
BASOPHILS # BLD AUTO: 0 10*3/MM3 (ref 0–0.2)
BASOPHILS NFR BLD AUTO: 0 % (ref 0–1.5)
BENZODIAZ UR QL SCN: NEGATIVE
BILIRUB UR QL STRIP: NEGATIVE
BLD GP AB SCN SERPL QL: NEGATIVE
BUPRENORPHINE SERPL-MCNC: NEGATIVE NG/ML
CANNABINOIDS SERPL QL: NEGATIVE
CLARITY UR: CLEAR
COCAINE UR QL: NEGATIVE
COLOR UR: YELLOW
DEPRECATED RDW RBC AUTO: 39.6 FL (ref 37–54)
EOSINOPHIL # BLD AUTO: 0.01 10*3/MM3 (ref 0–0.4)
EOSINOPHIL NFR BLD AUTO: 0.2 % (ref 0.3–6.2)
ERYTHROCYTE [DISTWIDTH] IN BLOOD BY AUTOMATED COUNT: 13 % (ref 12.3–15.4)
GLUCOSE SERPL-MCNC: 72 MG/DL (ref 65–99)
GLUCOSE UR STRIP-MCNC: NEGATIVE MG/DL
HBV SURFACE AG SERPL QL IA: NORMAL
HCT VFR BLD AUTO: 36.4 % (ref 34–46.6)
HCV AB SER DONR QL: NORMAL
HGB BLD-MCNC: 12.2 G/DL (ref 12–15.9)
HGB UR QL STRIP.AUTO: NEGATIVE
HIV1+2 AB SER QL: NORMAL
IMM GRANULOCYTES # BLD AUTO: 0.02 10*3/MM3 (ref 0–0.05)
IMM GRANULOCYTES NFR BLD AUTO: 0.3 % (ref 0–0.5)
KETONES UR QL STRIP: NEGATIVE
LEUKOCYTE ESTERASE UR QL STRIP.AUTO: NEGATIVE
LYMPHOCYTES # BLD AUTO: 0.81 10*3/MM3 (ref 0.7–3.1)
LYMPHOCYTES NFR BLD AUTO: 14 % (ref 19.6–45.3)
MCH RBC QN AUTO: 28.5 PG (ref 26.6–33)
MCHC RBC AUTO-ENTMCNC: 33.5 G/DL (ref 31.5–35.7)
MCV RBC AUTO: 85 FL (ref 79–97)
METHADONE UR QL SCN: NEGATIVE
MONOCYTES # BLD AUTO: 0.27 10*3/MM3 (ref 0.1–0.9)
MONOCYTES NFR BLD AUTO: 4.7 % (ref 5–12)
NEUTROPHILS NFR BLD AUTO: 4.68 10*3/MM3 (ref 1.7–7)
NEUTROPHILS NFR BLD AUTO: 80.8 % (ref 42.7–76)
NITRITE UR QL STRIP: NEGATIVE
NRBC BLD AUTO-RTO: 0 /100 WBC (ref 0–0.2)
OPIATES UR QL: NEGATIVE
OXYCODONE UR QL SCN: NEGATIVE
PCP UR QL SCN: NEGATIVE
PH UR STRIP.AUTO: 8.5 [PH] (ref 5–8)
PLATELET # BLD AUTO: 242 10*3/MM3 (ref 140–450)
PMV BLD AUTO: 11.7 FL (ref 6–12)
PROPOXYPH UR QL: NEGATIVE
PROT UR QL STRIP: NEGATIVE
RBC # BLD AUTO: 4.28 10*6/MM3 (ref 3.77–5.28)
RH BLD: POSITIVE
SP GR UR STRIP: 1.01 (ref 1–1.03)
TRICYCLICS UR QL SCN: NEGATIVE
TSH SERPL DL<=0.05 MIU/L-ACNC: 1.35 UIU/ML (ref 0.27–4.2)
UROBILINOGEN UR QL STRIP: ABNORMAL
WBC NRBC COR # BLD: 5.79 10*3/MM3 (ref 3.4–10.8)

## 2021-12-27 PROCEDURE — 82947 ASSAY GLUCOSE BLOOD QUANT: CPT

## 2021-12-27 PROCEDURE — 80306 DRUG TEST PRSMV INSTRMNT: CPT

## 2021-12-27 PROCEDURE — 84443 ASSAY THYROID STIM HORMONE: CPT

## 2021-12-27 PROCEDURE — 36415 COLL VENOUS BLD VENIPUNCTURE: CPT

## 2021-12-27 PROCEDURE — 80081 OBSTETRIC PANEL INC HIV TSTG: CPT

## 2021-12-27 PROCEDURE — 86803 HEPATITIS C AB TEST: CPT

## 2021-12-27 PROCEDURE — 81003 URINALYSIS AUTO W/O SCOPE: CPT

## 2021-12-28 LAB
RPR SER QL: NORMAL
RUBV IGG SERPL IA-ACNC: 14.3 INDEX

## 2022-02-28 ENCOUNTER — TRANSCRIBE ORDERS (OUTPATIENT)
Dept: OBSTETRICS AND GYNECOLOGY | Facility: HOSPITAL | Age: 37
End: 2022-02-28

## 2022-02-28 DIAGNOSIS — Z87.59 HISTORY OF ECTOPIC PREGNANCY: ICD-10-CM

## 2022-02-28 DIAGNOSIS — O43.109 PLACENTAL ABNORMALITY, ANTEPARTUM: Primary | ICD-10-CM

## 2022-03-11 ENCOUNTER — HOSPITAL ENCOUNTER (OUTPATIENT)
Dept: WOMENS IMAGING | Facility: HOSPITAL | Age: 37
Discharge: HOME | End: 2022-03-11
Admitting: OBSTETRICS & GYNECOLOGY

## 2022-03-11 ENCOUNTER — OFFICE VISIT (OUTPATIENT)
Dept: OBSTETRICS AND GYNECOLOGY | Facility: HOSPITAL | Age: 37
End: 2022-03-11

## 2022-03-11 VITALS
HEIGHT: 67 IN | DIASTOLIC BLOOD PRESSURE: 64 MMHG | BODY MASS INDEX: 21.94 KG/M2 | WEIGHT: 139.8 LBS | SYSTOLIC BLOOD PRESSURE: 110 MMHG

## 2022-03-11 DIAGNOSIS — O98.519 COVID-19 AFFECTING PREGNANCY, ANTEPARTUM: ICD-10-CM

## 2022-03-11 DIAGNOSIS — O43.109 PLACENTAL ABNORMALITY, ANTEPARTUM: ICD-10-CM

## 2022-03-11 DIAGNOSIS — Z87.59 HISTORY OF ECTOPIC PREGNANCY: ICD-10-CM

## 2022-03-11 DIAGNOSIS — U07.1 COVID-19 AFFECTING PREGNANCY, ANTEPARTUM: ICD-10-CM

## 2022-03-11 DIAGNOSIS — O43.109 PLACENTAL ABNORMALITY, ANTEPARTUM: Primary | ICD-10-CM

## 2022-03-11 DIAGNOSIS — Z34.90 PREGNANCY, UNSPECIFIED GESTATIONAL AGE: ICD-10-CM

## 2022-03-11 PROCEDURE — 76811 OB US DETAILED SNGL FETUS: CPT | Performed by: OBSTETRICS & GYNECOLOGY

## 2022-03-11 PROCEDURE — 76811 OB US DETAILED SNGL FETUS: CPT

## 2022-03-11 PROCEDURE — 99202 OFFICE O/P NEW SF 15 MIN: CPT | Performed by: OBSTETRICS & GYNECOLOGY

## 2022-05-20 ENCOUNTER — APPOINTMENT (OUTPATIENT)
Dept: WOMENS IMAGING | Facility: HOSPITAL | Age: 37
End: 2022-05-20

## 2022-06-06 LAB — EXTERNAL GROUP B STREP ANTIGEN: NEGATIVE

## 2022-06-07 ENCOUNTER — PREP FOR SURGERY (OUTPATIENT)
Dept: OTHER | Facility: HOSPITAL | Age: 37
End: 2022-06-07

## 2022-06-07 DIAGNOSIS — Z34.90 TERM PREGNANCY: Primary | ICD-10-CM

## 2022-06-07 RX ORDER — SODIUM CHLORIDE 0.9 % (FLUSH) 0.9 %
10 SYRINGE (ML) INJECTION EVERY 12 HOURS SCHEDULED
Status: CANCELLED | OUTPATIENT
Start: 2022-06-07

## 2022-06-07 RX ORDER — OXYTOCIN/0.9 % SODIUM CHLORIDE 30/500 ML
650 PLASTIC BAG, INJECTION (ML) INTRAVENOUS ONCE
Status: CANCELLED | OUTPATIENT
Start: 2022-06-07 | End: 2022-06-07

## 2022-06-07 RX ORDER — TERBUTALINE SULFATE 1 MG/ML
0.25 INJECTION, SOLUTION SUBCUTANEOUS AS NEEDED
Status: CANCELLED | OUTPATIENT
Start: 2022-06-07

## 2022-06-07 RX ORDER — BUTORPHANOL TARTRATE 1 MG/ML
1 INJECTION, SOLUTION INTRAMUSCULAR; INTRAVENOUS
Status: CANCELLED | OUTPATIENT
Start: 2022-06-07

## 2022-06-07 RX ORDER — IBUPROFEN 600 MG/1
600 TABLET ORAL EVERY 6 HOURS PRN
Status: CANCELLED | OUTPATIENT
Start: 2022-06-07

## 2022-06-07 RX ORDER — SODIUM CHLORIDE, SODIUM LACTATE, POTASSIUM CHLORIDE, CALCIUM CHLORIDE 600; 310; 30; 20 MG/100ML; MG/100ML; MG/100ML; MG/100ML
125 INJECTION, SOLUTION INTRAVENOUS CONTINUOUS
Status: CANCELLED | OUTPATIENT
Start: 2022-06-07

## 2022-06-07 RX ORDER — LIDOCAINE HYDROCHLORIDE 10 MG/ML
5 INJECTION, SOLUTION EPIDURAL; INFILTRATION; INTRACAUDAL; PERINEURAL AS NEEDED
Status: CANCELLED | OUTPATIENT
Start: 2022-06-07

## 2022-06-07 RX ORDER — ONDANSETRON 2 MG/ML
4 INJECTION INTRAMUSCULAR; INTRAVENOUS EVERY 6 HOURS PRN
Status: CANCELLED | OUTPATIENT
Start: 2022-06-07

## 2022-06-07 RX ORDER — ACETAMINOPHEN 325 MG/1
650 TABLET ORAL EVERY 4 HOURS PRN
Status: CANCELLED | OUTPATIENT
Start: 2022-06-07

## 2022-06-07 RX ORDER — MAGNESIUM CARB/ALUMINUM HYDROX 105-160MG
30 TABLET,CHEWABLE ORAL ONCE
Status: CANCELLED | OUTPATIENT
Start: 2022-06-07 | End: 2022-06-07

## 2022-06-07 RX ORDER — ONDANSETRON 4 MG/1
4 TABLET, FILM COATED ORAL EVERY 6 HOURS PRN
Status: CANCELLED | OUTPATIENT
Start: 2022-06-07

## 2022-06-07 RX ORDER — OXYTOCIN/0.9 % SODIUM CHLORIDE 30/500 ML
85 PLASTIC BAG, INJECTION (ML) INTRAVENOUS ONCE
Status: CANCELLED | OUTPATIENT
Start: 2022-06-07 | End: 2022-06-07

## 2022-06-07 RX ORDER — SODIUM CHLORIDE 0.9 % (FLUSH) 0.9 %
1-10 SYRINGE (ML) INJECTION AS NEEDED
Status: CANCELLED | OUTPATIENT
Start: 2022-06-07

## 2022-06-07 RX ORDER — OXYTOCIN/0.9 % SODIUM CHLORIDE 30/500 ML
2-24 PLASTIC BAG, INJECTION (ML) INTRAVENOUS
Status: CANCELLED | OUTPATIENT
Start: 2022-06-08

## 2022-06-07 RX ORDER — METHYLERGONOVINE MALEATE 0.2 MG/ML
200 INJECTION INTRAVENOUS ONCE AS NEEDED
Status: CANCELLED | OUTPATIENT
Start: 2022-06-07

## 2022-06-07 RX ORDER — MISOPROSTOL 200 UG/1
800 TABLET ORAL AS NEEDED
Status: CANCELLED | OUTPATIENT
Start: 2022-06-07

## 2022-06-07 RX ORDER — CARBOPROST TROMETHAMINE 250 UG/ML
250 INJECTION, SOLUTION INTRAMUSCULAR AS NEEDED
Status: CANCELLED | OUTPATIENT
Start: 2022-06-07

## 2022-06-26 ENCOUNTER — APPOINTMENT (OUTPATIENT)
Dept: PREADMISSION TESTING | Facility: HOSPITAL | Age: 37
End: 2022-06-26

## 2022-06-29 ENCOUNTER — HOSPITAL ENCOUNTER (INPATIENT)
Facility: HOSPITAL | Age: 37
LOS: 2 days | Discharge: HOME | End: 2022-07-02
Attending: OBSTETRICS & GYNECOLOGY | Admitting: OBSTETRICS & GYNECOLOGY

## 2022-06-29 ENCOUNTER — HOSPITAL ENCOUNTER (OUTPATIENT)
Dept: LABOR AND DELIVERY | Facility: HOSPITAL | Age: 37
Discharge: HOME | End: 2022-06-29

## 2022-06-29 DIAGNOSIS — Z34.90 TERM PREGNANCY: ICD-10-CM

## 2022-06-30 ENCOUNTER — ANESTHESIA (OUTPATIENT)
Dept: LABOR AND DELIVERY | Facility: HOSPITAL | Age: 37
End: 2022-06-30

## 2022-06-30 ENCOUNTER — ANESTHESIA EVENT (OUTPATIENT)
Dept: LABOR AND DELIVERY | Facility: HOSPITAL | Age: 37
End: 2022-06-30

## 2022-06-30 PROBLEM — Z34.90 TERM PREGNANCY: Status: RESOLVED | Noted: 2022-06-30 | Resolved: 2022-06-30

## 2022-06-30 PROBLEM — Z34.90 TERM PREGNANCY: Status: ACTIVE | Noted: 2022-06-30

## 2022-06-30 LAB
ABO GROUP BLD: NORMAL
BLD GP AB SCN SERPL QL: NEGATIVE
DEPRECATED RDW RBC AUTO: 50.4 FL (ref 37–54)
ERYTHROCYTE [DISTWIDTH] IN BLOOD BY AUTOMATED COUNT: 16.6 % (ref 12.3–15.4)
HCT VFR BLD AUTO: 32 % (ref 34–46.6)
HGB BLD-MCNC: 10.8 G/DL (ref 12–15.9)
MCH RBC QN AUTO: 28.2 PG (ref 26.6–33)
MCHC RBC AUTO-ENTMCNC: 33.8 G/DL (ref 31.5–35.7)
MCV RBC AUTO: 83.6 FL (ref 79–97)
PLATELET # BLD AUTO: 223 10*3/MM3 (ref 140–450)
PMV BLD AUTO: 11.6 FL (ref 6–12)
RBC # BLD AUTO: 3.83 10*6/MM3 (ref 3.77–5.28)
RH BLD: POSITIVE
T&S EXPIRATION DATE: NORMAL
WBC NRBC COR # BLD: 10.11 10*3/MM3 (ref 3.4–10.8)

## 2022-06-30 PROCEDURE — C1755 CATHETER, INTRASPINAL: HCPCS | Performed by: ANESTHESIOLOGY

## 2022-06-30 PROCEDURE — 59025 FETAL NON-STRESS TEST: CPT

## 2022-06-30 PROCEDURE — 86900 BLOOD TYPING SEROLOGIC ABO: CPT | Performed by: OBSTETRICS & GYNECOLOGY

## 2022-06-30 PROCEDURE — 25010000002 ROPIVACAINE PER 1 MG: Performed by: ANESTHESIOLOGY

## 2022-06-30 PROCEDURE — 59200 INSERT CERVICAL DILATOR: CPT | Performed by: OBSTETRICS & GYNECOLOGY

## 2022-06-30 PROCEDURE — 86901 BLOOD TYPING SEROLOGIC RH(D): CPT | Performed by: OBSTETRICS & GYNECOLOGY

## 2022-06-30 PROCEDURE — 25010000002 FENTANYL CITRATE (PF) 50 MCG/ML SOLUTION: Performed by: ANESTHESIOLOGY

## 2022-06-30 PROCEDURE — 86850 RBC ANTIBODY SCREEN: CPT | Performed by: OBSTETRICS & GYNECOLOGY

## 2022-06-30 PROCEDURE — 0KQM0ZZ REPAIR PERINEUM MUSCLE, OPEN APPROACH: ICD-10-PCS | Performed by: OBSTETRICS & GYNECOLOGY

## 2022-06-30 PROCEDURE — 85027 COMPLETE CBC AUTOMATED: CPT | Performed by: OBSTETRICS & GYNECOLOGY

## 2022-06-30 PROCEDURE — 3E033VJ INTRODUCTION OF OTHER HORMONE INTO PERIPHERAL VEIN, PERCUTANEOUS APPROACH: ICD-10-PCS | Performed by: OBSTETRICS & GYNECOLOGY

## 2022-06-30 RX ORDER — LIDOCAINE HYDROCHLORIDE AND EPINEPHRINE 15; 5 MG/ML; UG/ML
INJECTION, SOLUTION EPIDURAL AS NEEDED
Status: DISCONTINUED | OUTPATIENT
Start: 2022-06-30 | End: 2022-06-30 | Stop reason: SURG

## 2022-06-30 RX ORDER — MAGNESIUM CARB/ALUMINUM HYDROX 105-160MG
30 TABLET,CHEWABLE ORAL ONCE
Status: DISCONTINUED | OUTPATIENT
Start: 2022-06-30 | End: 2022-06-30 | Stop reason: HOSPADM

## 2022-06-30 RX ORDER — SODIUM CHLORIDE, SODIUM LACTATE, POTASSIUM CHLORIDE, CALCIUM CHLORIDE 600; 310; 30; 20 MG/100ML; MG/100ML; MG/100ML; MG/100ML
125 INJECTION, SOLUTION INTRAVENOUS CONTINUOUS
Status: DISCONTINUED | OUTPATIENT
Start: 2022-06-30 | End: 2022-07-02 | Stop reason: HOSPADM

## 2022-06-30 RX ORDER — BUTORPHANOL TARTRATE 1 MG/ML
1 INJECTION, SOLUTION INTRAMUSCULAR; INTRAVENOUS
Status: DISCONTINUED | OUTPATIENT
Start: 2022-06-30 | End: 2022-06-30 | Stop reason: HOSPADM

## 2022-06-30 RX ORDER — SODIUM CHLORIDE 0.9 % (FLUSH) 0.9 %
1-10 SYRINGE (ML) INJECTION AS NEEDED
Status: DISCONTINUED | OUTPATIENT
Start: 2022-06-30 | End: 2022-06-30 | Stop reason: HOSPADM

## 2022-06-30 RX ORDER — SIMETHICONE 80 MG
80 TABLET,CHEWABLE ORAL 4 TIMES DAILY PRN
Status: DISCONTINUED | OUTPATIENT
Start: 2022-06-30 | End: 2022-07-02 | Stop reason: HOSPADM

## 2022-06-30 RX ORDER — ROPIVACAINE HYDROCHLORIDE 2 MG/ML
15 INJECTION, SOLUTION EPIDURAL; INFILTRATION; PERINEURAL CONTINUOUS
Status: DISCONTINUED | OUTPATIENT
Start: 2022-06-30 | End: 2022-07-02 | Stop reason: HOSPADM

## 2022-06-30 RX ORDER — IBUPROFEN 600 MG/1
600 TABLET ORAL EVERY 6 HOURS PRN
Status: DISCONTINUED | OUTPATIENT
Start: 2022-06-30 | End: 2022-07-02 | Stop reason: HOSPADM

## 2022-06-30 RX ORDER — HYDROCORTISONE 25 MG/G
1 CREAM TOPICAL AS NEEDED
Status: DISCONTINUED | OUTPATIENT
Start: 2022-06-30 | End: 2022-07-02 | Stop reason: HOSPADM

## 2022-06-30 RX ORDER — PRENATAL VIT/IRON FUM/FOLIC AC 27MG-0.8MG
1 TABLET ORAL DAILY
Status: DISCONTINUED | OUTPATIENT
Start: 2022-06-30 | End: 2022-07-02 | Stop reason: HOSPADM

## 2022-06-30 RX ORDER — ONDANSETRON 4 MG/1
4 TABLET, FILM COATED ORAL EVERY 6 HOURS PRN
Status: DISCONTINUED | OUTPATIENT
Start: 2022-06-30 | End: 2022-06-30 | Stop reason: HOSPADM

## 2022-06-30 RX ORDER — BISACODYL 10 MG
10 SUPPOSITORY, RECTAL RECTAL DAILY PRN
Status: DISCONTINUED | OUTPATIENT
Start: 2022-07-01 | End: 2022-07-02 | Stop reason: HOSPADM

## 2022-06-30 RX ORDER — CARBOPROST TROMETHAMINE 250 UG/ML
250 INJECTION, SOLUTION INTRAMUSCULAR AS NEEDED
Status: DISCONTINUED | OUTPATIENT
Start: 2022-06-30 | End: 2022-06-30 | Stop reason: HOSPADM

## 2022-06-30 RX ORDER — ERYTHROMYCIN 5 MG/G
OINTMENT OPHTHALMIC
Status: DISCONTINUED
Start: 2022-06-30 | End: 2022-07-02 | Stop reason: HOSPADM

## 2022-06-30 RX ORDER — FENTANYL CITRATE 50 UG/ML
INJECTION, SOLUTION INTRAMUSCULAR; INTRAVENOUS AS NEEDED
Status: DISCONTINUED | OUTPATIENT
Start: 2022-06-30 | End: 2022-06-30 | Stop reason: SURG

## 2022-06-30 RX ORDER — FAMOTIDINE 10 MG/ML
20 INJECTION, SOLUTION INTRAVENOUS ONCE AS NEEDED
Status: DISCONTINUED | OUTPATIENT
Start: 2022-06-30 | End: 2022-06-30 | Stop reason: HOSPADM

## 2022-06-30 RX ORDER — EPHEDRINE SULFATE 5 MG/ML
10 INJECTION INTRAVENOUS
Status: DISCONTINUED | OUTPATIENT
Start: 2022-06-30 | End: 2022-06-30 | Stop reason: HOSPADM

## 2022-06-30 RX ORDER — DOCUSATE SODIUM 100 MG/1
100 CAPSULE, LIQUID FILLED ORAL 2 TIMES DAILY
Status: DISCONTINUED | OUTPATIENT
Start: 2022-06-30 | End: 2022-07-02 | Stop reason: HOSPADM

## 2022-06-30 RX ORDER — SODIUM CHLORIDE 0.9 % (FLUSH) 0.9 %
1-10 SYRINGE (ML) INJECTION AS NEEDED
Status: DISCONTINUED | OUTPATIENT
Start: 2022-06-30 | End: 2022-07-02 | Stop reason: HOSPADM

## 2022-06-30 RX ORDER — SODIUM CHLORIDE 0.9 % (FLUSH) 0.9 %
10 SYRINGE (ML) INJECTION EVERY 12 HOURS SCHEDULED
Status: DISCONTINUED | OUTPATIENT
Start: 2022-06-30 | End: 2022-06-30 | Stop reason: HOSPADM

## 2022-06-30 RX ORDER — LIDOCAINE HYDROCHLORIDE 10 MG/ML
5 INJECTION, SOLUTION EPIDURAL; INFILTRATION; INTRACAUDAL; PERINEURAL AS NEEDED
Status: DISCONTINUED | OUTPATIENT
Start: 2022-06-30 | End: 2022-06-30 | Stop reason: HOSPADM

## 2022-06-30 RX ORDER — OXYTOCIN/0.9 % SODIUM CHLORIDE 30/500 ML
85 PLASTIC BAG, INJECTION (ML) INTRAVENOUS ONCE
Status: DISCONTINUED | OUTPATIENT
Start: 2022-06-30 | End: 2022-06-30 | Stop reason: HOSPADM

## 2022-06-30 RX ORDER — TERBUTALINE SULFATE 1 MG/ML
0.25 INJECTION, SOLUTION SUBCUTANEOUS AS NEEDED
Status: DISCONTINUED | OUTPATIENT
Start: 2022-06-30 | End: 2022-06-30 | Stop reason: HOSPADM

## 2022-06-30 RX ORDER — OXYTOCIN/0.9 % SODIUM CHLORIDE 30/500 ML
2-24 PLASTIC BAG, INJECTION (ML) INTRAVENOUS
Status: DISCONTINUED | OUTPATIENT
Start: 2022-06-30 | End: 2022-06-30 | Stop reason: HOSPADM

## 2022-06-30 RX ORDER — ONDANSETRON 2 MG/ML
4 INJECTION INTRAMUSCULAR; INTRAVENOUS EVERY 6 HOURS PRN
Status: DISCONTINUED | OUTPATIENT
Start: 2022-06-30 | End: 2022-07-02 | Stop reason: HOSPADM

## 2022-06-30 RX ORDER — METHYLERGONOVINE MALEATE 0.2 MG/ML
200 INJECTION INTRAVENOUS ONCE AS NEEDED
Status: DISCONTINUED | OUTPATIENT
Start: 2022-06-30 | End: 2022-06-30 | Stop reason: HOSPADM

## 2022-06-30 RX ORDER — ONDANSETRON 2 MG/ML
4 INJECTION INTRAMUSCULAR; INTRAVENOUS ONCE AS NEEDED
Status: DISCONTINUED | OUTPATIENT
Start: 2022-06-30 | End: 2022-06-30 | Stop reason: HOSPADM

## 2022-06-30 RX ORDER — ACETAMINOPHEN 325 MG/1
650 TABLET ORAL EVERY 4 HOURS PRN
Status: DISCONTINUED | OUTPATIENT
Start: 2022-06-30 | End: 2022-06-30 | Stop reason: HOSPADM

## 2022-06-30 RX ORDER — ONDANSETRON 4 MG/1
4 TABLET, FILM COATED ORAL EVERY 6 HOURS PRN
Status: DISCONTINUED | OUTPATIENT
Start: 2022-06-30 | End: 2022-07-02 | Stop reason: HOSPADM

## 2022-06-30 RX ORDER — OXYTOCIN/0.9 % SODIUM CHLORIDE 30/500 ML
650 PLASTIC BAG, INJECTION (ML) INTRAVENOUS ONCE
Status: COMPLETED | OUTPATIENT
Start: 2022-06-30 | End: 2022-06-30

## 2022-06-30 RX ORDER — IBUPROFEN 600 MG/1
600 TABLET ORAL EVERY 6 HOURS PRN
Status: DISCONTINUED | OUTPATIENT
Start: 2022-06-30 | End: 2022-06-30 | Stop reason: HOSPADM

## 2022-06-30 RX ORDER — METOCLOPRAMIDE HYDROCHLORIDE 5 MG/ML
10 INJECTION INTRAMUSCULAR; INTRAVENOUS ONCE AS NEEDED
Status: DISCONTINUED | OUTPATIENT
Start: 2022-06-30 | End: 2022-06-30 | Stop reason: HOSPADM

## 2022-06-30 RX ORDER — ACETAMINOPHEN 325 MG/1
650 TABLET ORAL EVERY 4 HOURS PRN
Status: DISCONTINUED | OUTPATIENT
Start: 2022-06-30 | End: 2022-07-02 | Stop reason: HOSPADM

## 2022-06-30 RX ORDER — ONDANSETRON 2 MG/ML
4 INJECTION INTRAMUSCULAR; INTRAVENOUS EVERY 6 HOURS PRN
Status: DISCONTINUED | OUTPATIENT
Start: 2022-06-30 | End: 2022-06-30 | Stop reason: HOSPADM

## 2022-06-30 RX ORDER — DIPHENHYDRAMINE HYDROCHLORIDE 50 MG/ML
12.5 INJECTION INTRAMUSCULAR; INTRAVENOUS EVERY 8 HOURS PRN
Status: DISCONTINUED | OUTPATIENT
Start: 2022-06-30 | End: 2022-06-30 | Stop reason: HOSPADM

## 2022-06-30 RX ORDER — TRISODIUM CITRATE DIHYDRATE AND CITRIC ACID MONOHYDRATE 500; 334 MG/5ML; MG/5ML
30 SOLUTION ORAL ONCE
Status: DISCONTINUED | OUTPATIENT
Start: 2022-06-30 | End: 2022-06-30 | Stop reason: HOSPADM

## 2022-06-30 RX ORDER — OXYTOCIN/0.9 % SODIUM CHLORIDE 30/500 ML
2-24 PLASTIC BAG, INJECTION (ML) INTRAVENOUS
Status: DISCONTINUED | OUTPATIENT
Start: 2022-07-01 | End: 2022-06-30

## 2022-06-30 RX ORDER — MISOPROSTOL 200 UG/1
800 TABLET ORAL AS NEEDED
Status: DISCONTINUED | OUTPATIENT
Start: 2022-06-30 | End: 2022-06-30 | Stop reason: HOSPADM

## 2022-06-30 RX ADMIN — SODIUM CHLORIDE, POTASSIUM CHLORIDE, SODIUM LACTATE AND CALCIUM CHLORIDE 125 ML/HR: 600; 310; 30; 20 INJECTION, SOLUTION INTRAVENOUS at 07:25

## 2022-06-30 RX ADMIN — SODIUM CHLORIDE, POTASSIUM CHLORIDE, SODIUM LACTATE AND CALCIUM CHLORIDE 1000 ML: 600; 310; 30; 20 INJECTION, SOLUTION INTRAVENOUS at 06:41

## 2022-06-30 RX ADMIN — DOCUSATE SODIUM 100 MG: 100 CAPSULE, LIQUID FILLED ORAL at 20:06

## 2022-06-30 RX ADMIN — LIDOCAINE HYDROCHLORIDE AND EPINEPHRINE 2 ML: 15; 5 INJECTION, SOLUTION EPIDURAL at 06:48

## 2022-06-30 RX ADMIN — ROPIVACAINE HYDROCHLORIDE 15 ML/HR: 2 INJECTION, SOLUTION EPIDURAL; INFILTRATION at 06:53

## 2022-06-30 RX ADMIN — LIDOCAINE HYDROCHLORIDE AND EPINEPHRINE 3 ML: 15; 5 INJECTION, SOLUTION EPIDURAL at 06:47

## 2022-06-30 RX ADMIN — Medication 1 APPLICATION: at 14:30

## 2022-06-30 RX ADMIN — IBUPROFEN 600 MG: 600 TABLET ORAL at 22:22

## 2022-06-30 RX ADMIN — Medication: at 14:29

## 2022-06-30 RX ADMIN — SODIUM CHLORIDE, POTASSIUM CHLORIDE, SODIUM LACTATE AND CALCIUM CHLORIDE 125 ML/HR: 600; 310; 30; 20 INJECTION, SOLUTION INTRAVENOUS at 01:02

## 2022-06-30 RX ADMIN — WITCH HAZEL 1 PAD: 500 SOLUTION RECTAL; TOPICAL at 14:30

## 2022-06-30 RX ADMIN — SODIUM CHLORIDE, POTASSIUM CHLORIDE, SODIUM LACTATE AND CALCIUM CHLORIDE 125 ML/HR: 600; 310; 30; 20 INJECTION, SOLUTION INTRAVENOUS at 05:29

## 2022-06-30 RX ADMIN — FENTANYL CITRATE 100 MCG: 50 INJECTION, SOLUTION INTRAMUSCULAR; INTRAVENOUS at 06:49

## 2022-06-30 RX ADMIN — OXYTOCIN 650 ML/HR: 10 INJECTION INTRAVENOUS at 10:10

## 2022-06-30 RX ADMIN — IBUPROFEN 600 MG: 600 TABLET ORAL at 16:25

## 2022-07-01 LAB
HCT VFR BLD AUTO: 29.2 % (ref 34–46.6)
HGB BLD-MCNC: 9.6 G/DL (ref 12–15.9)

## 2022-07-01 PROCEDURE — 85014 HEMATOCRIT: CPT | Performed by: OBSTETRICS & GYNECOLOGY

## 2022-07-01 PROCEDURE — 85018 HEMOGLOBIN: CPT | Performed by: OBSTETRICS & GYNECOLOGY

## 2022-07-01 RX ADMIN — IBUPROFEN 600 MG: 600 TABLET ORAL at 06:16

## 2022-07-01 RX ADMIN — IBUPROFEN 600 MG: 600 TABLET ORAL at 18:01

## 2022-07-01 RX ADMIN — ACETAMINOPHEN 650 MG: 325 TABLET ORAL at 20:09

## 2022-07-01 RX ADMIN — DOCUSATE SODIUM 100 MG: 100 CAPSULE, LIQUID FILLED ORAL at 20:08

## 2022-07-01 RX ADMIN — DOCUSATE SODIUM 100 MG: 100 CAPSULE, LIQUID FILLED ORAL at 08:12

## 2022-07-01 RX ADMIN — IBUPROFEN 600 MG: 600 TABLET ORAL at 23:58

## 2022-07-01 RX ADMIN — IBUPROFEN 600 MG: 600 TABLET ORAL at 12:26

## 2022-07-01 NOTE — ANESTHESIA POSTPROCEDURE EVALUATION
Patient: Annette Garsia    Procedure Summary     Date: 06/30/22 Room / Location:     Anesthesia Start: 0642 Anesthesia Stop: 1008    Procedure: LABOR ANALGESIA Diagnosis:     Scheduled Providers:  Provider: Sung Appiah DO    Anesthesia Type: epidural ASA Status: 2          Anesthesia Type: epidural    Vitals  Vitals Value Taken Time   /67 07/01/22 0800   Temp 98.3 °F (36.8 °C) 07/01/22 0800   Pulse 76 07/01/22 0800   Resp 16 07/01/22 0800   SpO2 98 % 06/30/22 0724           Post Anesthesia Care and Evaluation    Patient location during evaluation: bedside  Patient participation: complete - patient participated  Level of consciousness: awake and alert  Pain management: adequate    Airway patency: patent  Anesthetic complications: No anesthetic complications    Cardiovascular status: acceptable  Respiratory status: acceptable  Hydration status: acceptable  Post Neuraxial Block status: Motor and sensory function returned to baseline and No signs or symptoms of PDPH

## 2022-07-01 NOTE — PROGRESS NOTES
7/1/2022  PPD #1    Subjective   Annette feels well.  Patient describes her lochia as less than menses.  Pain is well controlled       Objective   Temp: Temp:  [98 °F (36.7 °C)-98.6 °F (37 °C)] 98.2 °F (36.8 °C) Temp src: Oral   BP: BP: ()/(55-75) 98/60        Pulse: Heart Rate:  [68-98] 78  RR: Resp:  [16-18] 16    General:  No acute distress   Abdomen: Fundus firm and beneath umbilicus   Pelvis: deferred     Lab Results   Component Value Date    WBC 10.11 06/30/2022    HGB 9.6 (L) 07/01/2022    HCT 29.2 (L) 07/01/2022    MCV 83.6 06/30/2022     06/30/2022    HEPBSAG Non-Reactive 12/27/2021       Assessment  1. PPD# 1 after vaginal delivery  2. Chronic anemia of pregnancy and blood loss anemia from delivery: asymptomatic and clinically insignificant    Plan  1. Supportive care, anticipate discharge in am.  2. Po iron      This note has been electronically signed.    Martina Hill MD  08:40 EDT  July 1, 2022

## 2022-07-02 VITALS
HEART RATE: 62 BPM | RESPIRATION RATE: 20 BRPM | DIASTOLIC BLOOD PRESSURE: 79 MMHG | WEIGHT: 161 LBS | BODY MASS INDEX: 24.4 KG/M2 | OXYGEN SATURATION: 98 % | SYSTOLIC BLOOD PRESSURE: 122 MMHG | TEMPERATURE: 98.5 F | HEIGHT: 68 IN

## 2022-07-02 RX ADMIN — DOCUSATE SODIUM 100 MG: 100 CAPSULE, LIQUID FILLED ORAL at 08:52

## 2022-07-02 RX ADMIN — IBUPROFEN 600 MG: 600 TABLET ORAL at 07:13

## 2022-07-02 RX ADMIN — ACETAMINOPHEN 650 MG: 325 TABLET ORAL at 12:04

## 2022-07-02 NOTE — LACTATION NOTE
22 191   Maternal Information   Date of Referral 22   Person Making Referral lactation consultant   Maternal Reason for Referral breastfeeding currently   Infant Reason for Referral  infant   Maternal Assessment   Breast Size Issue none   Breast Shape Bilateral:;round   Breast Density Bilateral:;soft   Nipples Bilateral:;short;graspable   Left Nipple Symptoms intact;nontender   Right Nipple Symptoms intact;nontender   Maternal Infant Feeding   Maternal Emotional State independent;receptive;relaxed   Infant Positioning cradle;cross-cradle  (left cradle moved to cross cradle for deeper latch)   Signs of Milk Transfer deep jaw excursions noted   Pain with Feeding no  (per pt report)   Nipple Shape After Feeding, Left Breast lipstick shape  (in cradle position)   Latch Assistance verbal guidance offered   Support Person Involvement invited to assist with feeding   Additional Documentation Breastfeeding Supplementation (Group)   Breastfeeding Supplementation   Method of Supplementation paced bottle  (education completed)   Milk Expression/Equipment   Equipment for Home Use breast pump provided  (Spectra S2 provided through [employer] stock)   Breast Pumping   Breast Pumping Interventions post-feed pumping encouraged  (for short/missed feedings, if supplementation is required, or if breastfeeding becomes too painful to encourage breastmilk production)

## 2022-07-02 NOTE — PROGRESS NOTES
[facility]  Vaginal Delivery Progress Note    Subjective     Doing well, pain controlled, lochia less than menses      Objective     Vital Signs Range for the last 24 hours  Temperature: Temp:  [98 °F (36.7 °C)-98.5 °F (36.9 °C)] 98.5 °F (36.9 °C)   Temp Source: Temp src: Oral   BP: BP: (112-122)/(65-79) 122/79   Pulse: Heart Rate:  [62-72] 62   Respirations: Resp:  [16-20] 20   SPO2:     O2 Amount (l/min):     O2 Devices           Physical Exam:  General:  no acute distresss.  Abdomen: Soft, non-tender, fundus firm  Extremities: normal, atraumatic, no cyanosis, and trace edema.       Lab results reviewed:  Yes    Lab Results   Component Value Date    WBC 10.11 2022    HGB 9.6 (L) 2022    HCT 29.2 (L) 2022    MCV 83.6 2022     2022         Assessment & Plan        (spontaneous vaginal delivery)      Annette Garsia is Day 2  post-partum       Plan:  Discharge home with standard precautions and return to clinic in 4-6 weeks.      Cathy Phillip CNM  2022  12:45 EDT